# Patient Record
Sex: FEMALE | Race: WHITE | NOT HISPANIC OR LATINO | Employment: FULL TIME | URBAN - METROPOLITAN AREA
[De-identification: names, ages, dates, MRNs, and addresses within clinical notes are randomized per-mention and may not be internally consistent; named-entity substitution may affect disease eponyms.]

---

## 2024-06-12 NOTE — PROGRESS NOTES
Subjective   Patient ID: [id number]   Jade Hemphill is a 43 y.o. female who presents for neck pain, referred by her PCP Dr. Hawkins     Current rheum meds:  - None    Previous rheum meds:  - None     HPI  Covid in January, since then, she feels things have been gotten worse  She had issues with TMJ and is on treatment for it, wears certain gears for her TMJ, one at night, one during the day, doing laser therapy and on Tumeric  Neck, shoulders and arms pains, doing laser therapy for the muscle of the necks by her dentist and that is helping  Pains are worse at the end of the day   No swelling  No MS  On muscle relaxes for months, feels like it didn't help much   Took ibuprofen for months, stopped it a week ago, tried multiple NSAIDs, helped some   Received a month or so of Prednisone for a pinched nerve in her neck (numbness and pain in her left hand) that helped those sx resolve    + moderate stress   + moderate anxiety  + depression on prednisone, now better, comes and goes   + PTSD  + tension headaches, improved   + On and off diarrhea  + fatigue  + On and off chest pains   + rash over her legs, lacy, saw derm, told to see us first and see what it is    PSH: Carpel tunnel bilaterally   Allergies: NKDA   Habits: half a pack of cigarettes per day for 20 years, no alcohol, no drugs   Social hx: , 2 kids    ROS:  Constitutional: Denies fever, chills, weight loss, night sweats or headaches  Eyes: Denies dry eyes, blurry vision, redness or pain  ENT: Denies dry mouth, dental loss, loss of taste, nasal or oral ulcers, jaw claudication, difficulty swallowing, nasal crusting or recurrent sinus infections   Cardiovascular: Denies palpitations, orthopnea  Respiratory: Denies shortness of breath, cough, asthma, or recurrent respiratory infections  Gastrointestinal: Denies dysphagia, nausea, vomiting, heartburn, abdominal pain, constipation, diarrhea, melena or hematochezia  Genitourinary: No recurrent urinary infections or  STDs, no genital or anal ulcers  Integumentary: Denies photosensitivity, Raynaud's phenomenon, skin tightening, digital ulcers, psoriatic lesions, or alopecia  Neurological: Denies any numbness or tingling, muscle weakness, or incontinence   Hematologic/Lymphatic: Denies bleeding, bruising, history of clots (arterial or venous), or abortions/miscarriages/pregnancy complications   MSK: No inflammatory back pain, enthesitis, dactylitis. No morning stiffness   Muscular: Denies weakness, difficulty rising from chair or combing the hair, muscle aches, or problems with hand    FHx: Sister and brother with psoriasis     There is no problem list on file for this patient.     History reviewed. No pertinent past medical history.     History reviewed. No pertinent surgical history.     Social History     Socioeconomic History    Marital status:      Spouse name: Not on file    Number of children: Not on file    Years of education: Not on file    Highest education level: Not on file   Occupational History    Not on file   Tobacco Use    Smoking status: Not on file    Smokeless tobacco: Not on file   Substance and Sexual Activity    Alcohol use: Not on file    Drug use: Not on file    Sexual activity: Not on file   Other Topics Concern    Not on file   Social History Narrative    Not on file     Social Determinants of Health     Financial Resource Strain: Not on file   Food Insecurity: Not on file   Transportation Needs: Not on file   Physical Activity: Not on file   Stress: Not on file   Social Connections: Not on file   Intimate Partner Violence: Not on file   Housing Stability: Not on file     No Known Allergies     Current Outpatient Medications:     chlorzoxazone (Parafon Forte) 500 mg tablet, Take 1 tablet (500 mg) by mouth 3 times a day as needed for muscle spasms., Disp: , Rfl:     magnesium, amino acid chelate, 133 mg tablet, Take 1 tablet (133 mg) by mouth 2 times a day., Disp: , Rfl:     TURMERIC, BULK,  "MISC, , Disp: , Rfl:      Objective   Visit Vitals  /78   Pulse 81   Temp 36.9 °C (98.4 °F)   Resp 20   Wt 45.4 kg (100 lb)     Physical Exam:  General: AAOx3, Cooperative  Head: normocephalic, atraumatic, no hair loss   Eyes: EOMI, conjunctiva clear, sclera white, anicteric  Ears: hearing intact  Nose: no deformity, no crusting   Throat/Mouth: No oral deformities, no cheek swelling, mucosa appear moist, no oral ulcers noted or loss of dentition   Neck/Lymph: FROM, trachea midline  Lungs: chest expansion symmetric, clear to auscultation bilaterally. No wheezing, rhonchi, or stridor  Heart: S1, S2, RRR. No murmur or rub  Abdomen: Soft, non-tender without masses  Skin: Lacy rash over her legs   MSK: Upper Extremities:  Diffuse myofascial point TTP   Hand/Fingers: No erythema, edema, tenderness or warmth at DIP, PIP, or MCP joints, FROM grossly. Good hand . No nodules. No deformities   Wrists: No erythema, edema, warmth or tenderness at wrist, FROM grossly  Elbows: No tenderness, edema, erythema or warmth at elbows, FROM grossly. No nodules   Shoulders: No edema, erythema, tenderness or warmth at shoulders. FROM  Lower Extremities:   Hips: No obvious deformities. No joint tenderness, normal ROM grossly. Log roll test negative bilaterally. Roxana test is negative bilaterally. No trochanteric bursae TTP  Knees: No tenderness, deformities, edema, rashes, or warmth, normal ROM grossly. No crepitus, no pes anserine bursa TTP   Ankles, feet: No deformities, tenderness, edema, erythema, ulceration, or warmth at the ankle or MTP/IP joints, normal ROM grossly  Spine: Paracervical muscles TTP. No spinal tenderness to palpation. Bilateral SI joint tenderness    No results found for: \"WBC\", \"HGB\", \"HCT\", \"MCV\", \"PLT\"     Chemistry    No results found for: \"NA\", \"K\", \"CL\", \"CO2\", \"BUN\", \"CREATININE\", \"GLU\" No results found for: \"CALCIUM\", \"ALKPHOS\", \"AST\", \"ALT\", \"BILITOT\"     No results found for: \"CRP\"   No results found " "for: \"JUAN\", \"RF\", \"SEDRATE\"   No results found for: \"CKTOTAL\"  No results found for: \"NEUTROABS\"   No results found for: \"FERRITIN\"   No results found for: \"HEPATOT\", \"HEPAIGM\", \"HEPBCIGM\", \"HEPBCAB\", \"HBEAG\", \"HEPCAB\"   No results found for: \"ALT\", \"AST\", \"GGT\", \"ALKPHOS\", \"BILITOT\"   No results found for: \"PPD\"   No results found for: \"URICACID\"   No results found for: \"PTH\", \"CALCIUM\", \"CAION\", \"PHOS\"   No results found for: \"SPEP\", \"UPEP\"   No results found for: \"ALBUR\", \"[id number]\"     No image results found.        Assessment/Plan    This is a 43 y.o. female who presents for neck, shoulders and arms pains, referred by her PCP Dr. Hawkins     The patient is mainly complaining of mechanical pains in her neck muscles, shoulders and upper arms. She was diagnosed with TMJ, doing muscles relaxers, mouth guards, and laser therapy for the neck muscles and has been helping. All her sx started after Covid. She has no synovitis on exam. She has diffuse myofascial points TTP with all risk factors for fibromyalgia. Lacy rash over her legs, no hx of blood clots or obstetric complications. Saw derm and told to see us first, if needed they will do biopsy. Prednisone was given for neck pinched nerve and helped with that. Sibling with PsO, but no current IA, uveitis, dactylitis, enthesitis, IBP or inflammatory SI joint pains. No personal PsO. Will do the work up for completion. Discussion done with the patient about OA and fibromaylgia    Labs:  3/24: Troponin, TSH, CBC, Mg, BMP, CMP, ferritin, B12 wnl  CCP, RF, JUAN neg    Imaging:  Xray hips, SI joints, shoulders and T spine: Normal    Xray C spine: Slight anterolisthesis of C2 on  C3, C3 on C4, C4 on C5 with flexion      # Polyarthralgia     - Labs today  - I will inform the patient of any urgent results, if any  - If all tests are negative, will refer to [facility] and pain management  - Recommended swimming to help with her muscle pains     RTC in 1 month for " discussion of results, virtually     Plan, including risks and benefits, was discussed with the patient, informed on how to reach us.     To schedule an appointment, call [phone]  To reach the rheumatology office, call [phone]    Светлана Yang MD      Division of Rheumatology  [facility]

## 2024-06-18 ENCOUNTER — APPOINTMENT (OUTPATIENT)
Dept: RHEUMATOLOGY | Facility: CLINIC | Age: 44
End: 2024-06-18
Payer: COMMERCIAL

## 2024-06-18 ENCOUNTER — LAB (OUTPATIENT)
Dept: LAB | Facility: LAB | Age: 44
End: 2024-06-18
Payer: COMMERCIAL

## 2024-06-18 VITALS
HEART RATE: 81 BPM | RESPIRATION RATE: 20 BRPM | DIASTOLIC BLOOD PRESSURE: 78 MMHG | WEIGHT: 100 LBS | SYSTOLIC BLOOD PRESSURE: 113 MMHG | TEMPERATURE: 98.4 F

## 2024-06-18 DIAGNOSIS — M79.7 FIBROMYALGIA: ICD-10-CM

## 2024-06-18 DIAGNOSIS — M54.2 CERVICALGIA: ICD-10-CM

## 2024-06-18 DIAGNOSIS — M54.2 CERVICALGIA: Primary | ICD-10-CM

## 2024-06-18 DIAGNOSIS — M25.50 POLYARTHRALGIA: ICD-10-CM

## 2024-06-18 DIAGNOSIS — M13.0 POLYARTHRITIS, UNSPECIFIED: ICD-10-CM

## 2024-06-18 DIAGNOSIS — M54.2 NECK PAIN: ICD-10-CM

## 2024-06-18 DIAGNOSIS — M47.812 SPONDYLOSIS OF CERVICAL REGION WITHOUT MYELOPATHY OR RADICULOPATHY: ICD-10-CM

## 2024-06-18 LAB
25(OH)D3 SERPL-MCNC: 18 NG/ML (ref 30–100)
ALBUMIN SERPL BCP-MCNC: 4.6 G/DL (ref 3.4–5)
ALP SERPL-CCNC: 39 U/L (ref 33–110)
ALT SERPL W P-5'-P-CCNC: 15 U/L (ref 7–45)
ANION GAP SERPL CALC-SCNC: 12 MMOL/L (ref 10–20)
AST SERPL W P-5'-P-CCNC: 17 U/L (ref 9–39)
BASOPHILS # BLD AUTO: 0.05 X10*3/UL (ref 0–0.1)
BASOPHILS NFR BLD AUTO: 0.7 %
BILIRUB SERPL-MCNC: 0.8 MG/DL (ref 0–1.2)
BUN SERPL-MCNC: 9 MG/DL (ref 6–23)
CALCIUM SERPL-MCNC: 9 MG/DL (ref 8.6–10.3)
CHLORIDE SERPL-SCNC: 105 MMOL/L (ref 98–107)
CK SERPL-CCNC: 41 U/L (ref 0–215)
CO2 SERPL-SCNC: 26 MMOL/L (ref 21–32)
CREAT SERPL-MCNC: 0.76 MG/DL (ref 0.5–1.05)
EGFRCR SERPLBLD CKD-EPI 2021: >90 ML/MIN/1.73M*2
EOSINOPHIL # BLD AUTO: 0.1 X10*3/UL (ref 0–0.7)
EOSINOPHIL NFR BLD AUTO: 1.4 %
ERYTHROCYTE [DISTWIDTH] IN BLOOD BY AUTOMATED COUNT: 12.2 % (ref 11.5–14.5)
FERRITIN SERPL-MCNC: 33 NG/ML (ref 8–150)
GLUCOSE SERPL-MCNC: 81 MG/DL (ref 74–99)
HCT VFR BLD AUTO: 43 % (ref 36–46)
HGB BLD-MCNC: 14.2 G/DL (ref 12–16)
IMM GRANULOCYTES # BLD AUTO: 0.01 X10*3/UL (ref 0–0.7)
IMM GRANULOCYTES NFR BLD AUTO: 0.1 % (ref 0–0.9)
LYMPHOCYTES # BLD AUTO: 2.13 X10*3/UL (ref 1.2–4.8)
LYMPHOCYTES NFR BLD AUTO: 30 %
MCH RBC QN AUTO: 32.3 PG (ref 26–34)
MCHC RBC AUTO-ENTMCNC: 33 G/DL (ref 32–36)
MCV RBC AUTO: 98 FL (ref 80–100)
MONOCYTES # BLD AUTO: 0.36 X10*3/UL (ref 0.1–1)
MONOCYTES NFR BLD AUTO: 5.1 %
NEUTROPHILS # BLD AUTO: 4.45 X10*3/UL (ref 1.2–7.7)
NEUTROPHILS NFR BLD AUTO: 62.7 %
NRBC BLD-RTO: 0 /100 WBCS (ref 0–0)
PLATELET # BLD AUTO: 155 X10*3/UL (ref 150–450)
POTASSIUM SERPL-SCNC: 4.4 MMOL/L (ref 3.5–5.3)
PROT SERPL-MCNC: 6.6 G/DL (ref 6.4–8.2)
RBC # BLD AUTO: 4.4 X10*6/UL (ref 4–5.2)
SODIUM SERPL-SCNC: 139 MMOL/L (ref 136–145)
TSH SERPL-ACNC: 1.71 MIU/L (ref 0.44–3.98)
WBC # BLD AUTO: 7.1 X10*3/UL (ref 4.4–11.3)

## 2024-06-18 PROCEDURE — 86038 ANTINUCLEAR ANTIBODIES: CPT

## 2024-06-18 PROCEDURE — 85730 THROMBOPLASTIN TIME PARTIAL: CPT

## 2024-06-18 PROCEDURE — 86431 RHEUMATOID FACTOR QUANT: CPT

## 2024-06-18 PROCEDURE — 86160 COMPLEMENT ANTIGEN: CPT

## 2024-06-18 PROCEDURE — 86200 CCP ANTIBODY: CPT

## 2024-06-18 PROCEDURE — 36415 COLL VENOUS BLD VENIPUNCTURE: CPT

## 2024-06-18 PROCEDURE — 85025 COMPLETE CBC W/AUTO DIFF WBC: CPT

## 2024-06-18 PROCEDURE — 86146 BETA-2 GLYCOPROTEIN ANTIBODY: CPT

## 2024-06-18 PROCEDURE — 81381 HLA I TYPING 1 ALLELE HR: CPT

## 2024-06-18 PROCEDURE — 80053 COMPREHEN METABOLIC PANEL: CPT

## 2024-06-18 PROCEDURE — 82728 ASSAY OF FERRITIN: CPT

## 2024-06-18 PROCEDURE — 84443 ASSAY THYROID STIM HORMONE: CPT

## 2024-06-18 PROCEDURE — 99205 OFFICE O/P NEW HI 60 MIN: CPT | Performed by: STUDENT IN AN ORGANIZED HEALTH CARE EDUCATION/TRAINING PROGRAM

## 2024-06-18 PROCEDURE — 86147 CARDIOLIPIN ANTIBODY EA IG: CPT

## 2024-06-18 PROCEDURE — 82550 ASSAY OF CK (CPK): CPT

## 2024-06-18 PROCEDURE — 85613 RUSSELL VIPER VENOM DILUTED: CPT

## 2024-06-18 PROCEDURE — 82306 VITAMIN D 25 HYDROXY: CPT

## 2024-06-18 RX ORDER — CHLORZOXAZONE 500 MG/1
500 TABLET ORAL 3 TIMES DAILY PRN
COMMUNITY

## 2024-06-19 LAB
B2 GLYCOPROT1 IGA SER-ACNC: 2.9 U/ML
B2 GLYCOPROT1 IGG SER-ACNC: <1.4 U/ML
B2 GLYCOPROT1 IGM SER-ACNC: 3.6 U/ML
C3 SERPL-MCNC: 107 MG/DL (ref 87–200)
C4 SERPL-MCNC: 23 MG/DL (ref 10–50)
CARDIOLIPIN IGA SERPL-ACNC: 2.9 APL U/ML
CARDIOLIPIN IGG SER IA-ACNC: <1.6 GPL U/ML
CARDIOLIPIN IGM SER IA-ACNC: 4.6 MPL U/ML
CCP IGG SERPL-ACNC: <1 U/ML
RHEUMATOID FACT SER NEPH-ACNC: <10 IU/ML (ref 0–15)

## 2024-06-20 DIAGNOSIS — E55.9 VITAMIN D DEFICIENCY: Primary | ICD-10-CM

## 2024-06-20 LAB
ANA SER QL HEP2 SUBST: NEGATIVE
DRVVT SCREEN TO CONFIRM RATIO: 0.92 RATIO
DRVVT/DRVVT CFM NRMLZD PPP-RTO: 1.01 RATIO
DRVVT/DRVVT CFM P DOAC NEUT NORM PPP-RTO: 0.91 RATIO
HLAB27 TYPING: NEGATIVE
LA 2 SCREEN W REFLEX-IMP: NORMAL
NORMALIZED SCT PPP-RTO: 1.02 RATIO
SILICA CLOTTING TIME CONFIRMATION: 1.02 RATIO
SILICA CLOTTING TIME SCREEN: 1.04 RATIO

## 2024-06-20 RX ORDER — ERGOCALCIFEROL 1.25 MG/1
50000 CAPSULE ORAL
Qty: 12 CAPSULE | Refills: 1 | Status: SHIPPED | OUTPATIENT
Start: 2024-06-23 | End: 2024-12-20

## 2024-07-02 ENCOUNTER — APPOINTMENT (OUTPATIENT)
Dept: RHEUMATOLOGY | Facility: CLINIC | Age: 44
End: 2024-07-02
Payer: COMMERCIAL

## 2024-08-02 NOTE — PROGRESS NOTES
Subjective   Patient ID: [id number]   Jade Hemphill is a 43 y.o. female who presents for neck pain follow up    Current rheum meds:  - Vitamin D 50,000 international units weekly, started in 6/24     Previous rheum meds:  - None     HPI   Doing Yoga, pool therapy  Still doing the laser therapy   Reports improvement in her pains  Started the vitamin D back in June  Reports some burning sensation around her ears and forehead when she scratches   The patient is otherwise doing well  No morning stiffness, no swelling   No episodes of eye inflammation  No sicca sx  No chest pain, cough or dyspnea  No rashes  No infections    ROS:  As per HPI     Rheum hx:  Covid in January, since then, she feels things have been gotten worse  She had issues with TMJ and is on treatment for it, wears certain gears for her TMJ, one at night, one during the day, doing laser therapy and on Tumeric  Neck, shoulders and arms pains, doing laser therapy for the muscle of the necks by her dentist and that is helping  Pains are worse at the end of the day   No swelling  No MS  On muscle relaxes for months, feels like it didn't help much   Took ibuprofen for months, stopped it a week ago, tried multiple NSAIDs, helped some   Received a month or so of Prednisone for a pinched nerve in her neck (numbness and pain in her left hand) that helped those sx resolve    + moderate stress   + moderate anxiety  + depression on prednisone, now better, comes and goes   + PTSD  + tension headaches, improved   + On and off diarrhea  + fatigue  + On and off chest pains   + rash over her legs, lacy, saw derm, told to see us first and see what it is    PSH: Carpel tunnel bilaterally   Allergies: NKDA   Habits: half a pack of cigarettes per day for 20 years, no alcohol, no drugs   Social hx: , 2 kids  FHx: Sister and brother with psoriasis     There is no problem list on file for this patient.     No past medical history on file.     No past surgical history on  file.     Social History     Socioeconomic History    Marital status:      Spouse name: Not on file    Number of children: Not on file    Years of education: Not on file    Highest education level: Not on file   Occupational History    Not on file   Tobacco Use    Smoking status: Not on file    Smokeless tobacco: Not on file   Substance and Sexual Activity    Alcohol use: Not on file    Drug use: Not on file    Sexual activity: Not on file   Other Topics Concern    Not on file   Social History Narrative    Not on file     Social Determinants of Health     Financial Resource Strain: Not on file   Food Insecurity: Not on file   Transportation Needs: Not on file   Physical Activity: Not on file   Stress: Not on file   Social Connections: Not on file   Intimate Partner Violence: Not on file   Housing Stability: Not on file     No Known Allergies     Current Outpatient Medications:     chlorzoxazone (Parafon Forte) 500 mg tablet, Take 1 tablet (500 mg) by mouth 3 times a day as needed for muscle spasms., Disp: , Rfl:     ergocalciferol (Vitamin D-2) 1.25 MG (83768 UT) capsule, Take 1 capsule (50,000 Units) by mouth 1 (one) time per week., Disp: 12 capsule, Rfl: 1    magnesium, amino acid chelate, 133 mg tablet, Take 1 tablet (133 mg) by mouth 2 times a day., Disp: , Rfl:     TURMERIC, BULK, MISC, , Disp: , Rfl:      Objective   There were no vitals taken for this visit.    Physical Exam:  General: AAOx3, Cooperative  Head: normocephalic, atraumatic, no hair loss   Eyes: EOMI, conjunctiva clear, sclera white, anicteric  Ears: hearing intact  Nose: no deformity, no crusting   Throat/Mouth: No oral deformities, no cheek swelling, mucosa appear moist, no oral ulcers noted or loss of dentition   Neck/Lymph: FROM, trachea midline  Lungs: chest expansion symmetric, clear to auscultation bilaterally. No wheezing, rhonchi, or stridor  Heart: S1, S2, RRR. No murmur or rub  Abdomen: Soft, non-tender without masses  Skin:  Lacy rash over her legs   MSK: Upper Extremities:  Diffuse myofascial point TTP   Hand/Fingers: No erythema, edema, tenderness or warmth at DIP, PIP, or MCP joints, FROM grossly. Good hand . No nodules. No deformities   Wrists: No erythema, edema, warmth or tenderness at wrist, FROM grossly  Elbows: No tenderness, edema, erythema or warmth at elbows, FROM grossly. No nodules   Shoulders: No edema, erythema, tenderness or warmth at shoulders. FROM  Lower Extremities:   Hips: No obvious deformities. No joint tenderness, normal ROM grossly. Log roll test negative bilaterally. Roxana test is negative bilaterally. No trochanteric bursae TTP  Knees: No tenderness, deformities, edema, rashes, or warmth, normal ROM grossly. No crepitus, no pes anserine bursa TTP   Ankles, feet: No deformities, tenderness, edema, erythema, ulceration, or warmth at the ankle or MTP/IP joints, normal ROM grossly  Spine: Paracervical muscles TTP. No spinal tenderness to palpation. Bilateral SI joint tenderness    Lab Results   Component Value Date    WBC 7.1 06/18/2024    HGB 14.2 06/18/2024    HCT 43.0 06/18/2024    MCV 98 06/18/2024     06/18/2024        Chemistry    Lab Results   Component Value Date/Time     06/18/2024 1107    K 4.4 06/18/2024 1107     06/18/2024 1107    CO2 26 06/18/2024 1107    BUN 9 06/18/2024 1107    CREATININE 0.76 06/18/2024 1107    Lab Results   Component Value Date/Time    CALCIUM 9.0 06/18/2024 1107    ALKPHOS 39 06/18/2024 1107    AST 17 06/18/2024 1107    ALT 15 06/18/2024 1107    BILITOT 0.8 06/18/2024 1107        Lab Results   Component Value Date    JUAN Negative 06/18/2024    RF <10 06/18/2024      Lab Results   Component Value Date    CKTOTAL 41 06/18/2024     Lab Results   Component Value Date    NEUTROABS 4.45 06/18/2024      Lab Results   Component Value Date    FERRITIN 33 06/18/2024      Lab Results   Component Value Date    ALT 15 06/18/2024    AST 17 06/18/2024    ALKPHOS 39  06/18/2024    BILITOT 0.8 06/18/2024      Lab Results   Component Value Date    CALCIUM 9.0 06/18/2024     No image results found.        Assessment/Plan    This is a 43 y.o. female who presents for neck, shoulders and arms pains follow up  Last seen in 6/24    The patient is mainly complaining of mechanical pains in her neck muscles, shoulders and upper arms. She was diagnosed with TMJ, doing muscles relaxers, mouth guards, and laser therapy for the neck muscles and has been helping. All her sx started after Covid. She has no synovitis on exam. She has diffuse myofascial points TTP with all risk factors for fibromyalgia. Lacy rash over her legs, no hx of blood clots or obstetric complications. Saw derm and told to see us first, if needed they will do biopsy. Prednisone was given for neck pinched nerve and helped with that. Sibling with PsO, but no current IA, uveitis, dactylitis, enthesitis, IBP or inflammatory SI joint pains. No personal PsO. Will do the work up for completion. Discussion done with the patient about OA and fibromaylgia    Labs:  6/24: CPK, TSH, ferritin, CBC, CMP, C3, C4 wnl. Vitamin D 18  RF, CCP, JUAN, HLA B 27, APLs neg  3/24: Troponin, TSH, CBC, Mg, BMP, CMP, ferritin, B12 wnl  CCP, RF, JUAN neg    Imaging:  Xray hips, SI joints, shoulders and T spine: Normal    Xray C spine: Slight anterolisthesis of C2 on C3, C3 on C4, C4 on C5 with flexion    # Fibromyalgia  # OA  # Vitamin D def    - Continue vitamin D  - Continue tx for TMJ    - Refer to psych and pain management  - Recommended to continue swimming to help with her muscle pains   - Continue Yoga    RTC as needed     Plan, including risks and benefits, was discussed with the patient, informed on how to reach us.     To schedule an appointment, call [phone]  To reach the rheumatology office, call [phone]    Светлана Yang MD      Division of Rheumatology  [facility]

## 2024-08-07 ENCOUNTER — APPOINTMENT (OUTPATIENT)
Dept: RHEUMATOLOGY | Facility: CLINIC | Age: 44
End: 2024-08-07
Payer: COMMERCIAL

## 2024-08-07 DIAGNOSIS — M54.2 CERVICALGIA: ICD-10-CM

## 2024-08-07 DIAGNOSIS — M47.812 SPONDYLOSIS OF CERVICAL REGION WITHOUT MYELOPATHY OR RADICULOPATHY: ICD-10-CM

## 2024-08-07 DIAGNOSIS — E55.9 VITAMIN D DEFICIENCY: ICD-10-CM

## 2024-08-07 DIAGNOSIS — M79.7 FIBROMYALGIA: Primary | ICD-10-CM

## 2024-08-07 PROCEDURE — 99214 OFFICE O/P EST MOD 30 MIN: CPT | Performed by: STUDENT IN AN ORGANIZED HEALTH CARE EDUCATION/TRAINING PROGRAM

## 2025-06-25 ENCOUNTER — OFFICE VISIT (OUTPATIENT)
Dept: NEUROLOGY | Facility: HOSPITAL | Age: 45
End: 2025-06-25
Payer: COMMERCIAL

## 2025-06-25 VITALS — SYSTOLIC BLOOD PRESSURE: 104 MMHG | DIASTOLIC BLOOD PRESSURE: 70 MMHG | WEIGHT: 97.7 LBS | HEART RATE: 76 BPM

## 2025-06-25 DIAGNOSIS — G44.229 CHRONIC TENSION-TYPE HEADACHE, NOT INTRACTABLE: Primary | ICD-10-CM

## 2025-06-25 PROCEDURE — 99214 OFFICE O/P EST MOD 30 MIN: CPT | Performed by: PSYCHIATRY & NEUROLOGY

## 2025-06-25 PROCEDURE — 99204 OFFICE O/P NEW MOD 45 MIN: CPT | Performed by: PSYCHIATRY & NEUROLOGY

## 2025-06-25 ASSESSMENT — PAIN SCALES - GENERAL: PAINLEVEL_OUTOF10: 0-NO PAIN

## 2025-06-25 NOTE — PATIENT INSTRUCTIONS
No indication for Botox for tension HA  Have your eyes checked    Rtc as needed. Follow-up with PCP.

## 2025-06-25 NOTE — LETTER
"June 25, 2025     Gabriele Meyer MD  [address] [place] [facility] [address]    Patient: Jade Hemphill   YOB: 1980   Date of Visit: 6/25/2025       Dear Dr. Gabriele Meyer MD:    Thank you for referring Jade Hemphill to me for evaluation. Below are my notes for this consultation.  If you have questions, please do not hesitate to call me. I look forward to following your patient along with you.       Sincerely,     Ja Okeefe MD      CC: No Recipients  ______________________________________________________________________________________    In-clinic visit    Visit type: provider referral: Dr Gabriele Meyer    PCP: Gabriele Meyer MD.    Subjective    Jade Hemphill is a 44 y.o. year old left handed female who presents with Headache (Tension headache.).    Patient is accompanied by none.     HPI    Here referred by PCP for \"tension headache\", specifically for consideration of Botox injection.    Pt no-showed x1, and had poor internet connection for virtual visit another time.    States had COVID 1/2024, got real sick, ended up with tension HA that won't go away. Ended up with other symptoms as well, described as burning ears, clenching jaw, slurring. Saw TMJ specialist. Was dx fibromyalgia with rheumatologist.     Describes feeling tight on forehead. Feels like it will \"explode\" at times. Was started by PCP on paroxetine, HA now feels better after about 6 weeks. HA sometimes lasts days to months in the past. Non-throbbing. No nausea/vomiting. Initially had some sound sensitivity. Cold in the face used to cause face to tense up.    In last 2 weeks, has had no HA whatsoever. 3 weeks prior to that, felt he may have some slight tension HA.    No head scan.    Has not had eyes checked in a few years.    Massage therapy helped the first time, then went back but felt the massage was overdone and she was in more pain after.    She states she was told by PCP she has tension HA, and was referred to " neurology specifically for consideration of Botox therapy.    Daily smoker. No alcohol. Marijuana use.      Review of Systems   Constitutional:  Negative for appetite change, chills and fever.   HENT:  Negative for ear pain and nosebleeds.    Eyes:  Negative for discharge and itching.   Respiratory:  Negative for choking and chest tightness.    Cardiovascular:  Negative for chest pain and palpitations.   Gastrointestinal:  Negative for abdominal distention, abdominal pain and nausea.   Endocrine: Negative for cold intolerance and heat intolerance.   Genitourinary:  Negative for difficulty urinating and dysuria.   Musculoskeletal:  Negative for gait problem and myalgias.   Neurological:  Negative for dizziness, seizures and numbness.     Problem List[1]    Medical History[2]  Surgical History[3]  Social History     Tobacco Use   • Smoking status: Every Day     Current packs/day: 0.50     Average packs/day: 0.5 packs/day for 27.6 years (13.8 ttl pk-yrs)     Types: Cigarettes     Start date: 1998   • Smokeless tobacco: Never   Substance Use Topics   • Alcohol use: Not Currently     Comment: 1-2 drinks/year     family history includes Parkinsonism in her maternal grandfather.    Allergies[4]  Current Medications[5]    Objective    /70   Pulse 76   Wt (!) 44.3 kg (97 lb 11.2 oz)     No TTP B cervical PSP muscles  No TTP B occipital ridge  No TTP B temporal area  (+) slight spasm B trapezius (R>L)    Awake, alert, oriented x3, in no distress  Well-nourished, ambulatory independently  No leg edema    Mental status exam as above, conversant   Fair fund of knowledge  Recent/remote memory fair  Fair attention span  Pupils round reactive to light, 3-4 mm, (-) RAPD   Fundoscopic examination showed flat sharp disc bilat  Full EOMs intact, no nystagmus, no ptosis   V1 to V3 sensation is intact   No facial droop   Hearing grossly intact   No dysarthria  Good shoulder shrug bilaterally   Tongue is midline     Motor strength  is 5/5 on all extremities, tone/bulk normal   Reflexes 1+ on all 4 extremities, downgoing toes bilaterally   Sensation is intact to light touch, vibration on all 4 extremities   Finger to nose test intact bilaterally   Negative Romberg sign   Normal gait       Lab Results   Component Value Date    WBC 7.1 06/18/2024    RBC 4.40 06/18/2024    HGB 14.2 06/18/2024    HCT 43.0 06/18/2024     06/18/2024     06/18/2024    K 4.4 06/18/2024     06/18/2024    BUN 9 06/18/2024    CREATININE 0.76 06/18/2024    EGFR >90 06/18/2024    CALCIUM 9.0 06/18/2024    ALKPHOS 39 06/18/2024    AST 17 06/18/2024    ALT 15 06/18/2024    VITD25 18 (L) 06/18/2024    HGBA1C 5 05/13/2025    TSH 1.71 06/18/2024        Assessment/Plan    Chronic tension headache  Pt has chronic tension headache, which has been getting better--? With time vs with the help of paroxetine  Pt does not have chronic migraine  Indication for Botox therapy is for chronic migraine. I am not aware of any indication for chronic tension headache  Discussed above with pt  She has tried some other conservative therapies including massages apparently  No brain imaging--normal neuro exam  I don't see strong reason to do brain imaging at this time    Discussed with pt. She states she has some trigger point injections upcoming with PCP.    Plans:  No indication for Botox for tension HA  Have your eyes checked    Rtc as needed. Follow-up with PCP.    All questions were answered.  Pt knows how to contact my office in case pt has any questions or concerns.    Ja Okeefe MD              [1]  Patient Active Problem List  Diagnosis   • Chronic tension-type headache, not intractable   [2]  Past Medical History:  Diagnosis Date   • Anxiety Na   • CTS (carpal tunnel syndrome) 2016   • Fibromyalgia, primary 2024   • Headache 2024   [3]  Past Surgical History:  Procedure Laterality Date   • CARPAL TUNNEL RELEASE  2018   [4]  Allergies  Allergen Reactions   • Pediazole  [Erythromycin-Sulfisoxazole] Rash   [5]    Current Outpatient Medications:   •  MAGNESIUM GLYCINATE ORAL, Take by mouth. 2 caps which equals 310 mg qd, Disp: , Rfl:   •  omega-3 acid ethyl esters (Lovaza) 1 gram capsule, Take 1 capsule (1 g) by mouth 2 times a day. 1280 mg every day via capsule form, Disp: , Rfl:   •  PARoxetine (Paxil) 10 mg tablet, Take 1 tablet (10 mg) by mouth once daily in the morning., Disp: , Rfl:   •  TURMERIC, BULK, MISC, , Disp: , Rfl:        [1]  Patient Active Problem List  Diagnosis   • Chronic tension-type headache, not intractable   [2]  Past Medical History:  Diagnosis Date   • Anxiety Na   • CTS (carpal tunnel syndrome) 2016   • Fibromyalgia, primary 2024   • Headache 2024   [3]  Past Surgical History:  Procedure Laterality Date   • CARPAL TUNNEL RELEASE  2018   [4]  Allergies  Allergen Reactions   • Pediazole [Erythromycin-Sulfisoxazole] Rash   [5]    Current Outpatient Medications:   •  MAGNESIUM GLYCINATE ORAL, Take by mouth. 2 caps which equals 310 mg qd, Disp: , Rfl:   •  omega-3 acid ethyl esters (Lovaza) 1 gram capsule, Take 1 capsule (1 g) by mouth 2 times a day. 1280 mg every day via capsule form, Disp: , Rfl:   •  PARoxetine (Paxil) 10 mg tablet, Take 1 tablet (10 mg) by mouth once daily in the morning., Disp: , Rfl:   •  TURMERIC, BULK, MISC, , Disp: , Rfl:

## 2025-06-25 NOTE — PROGRESS NOTES
"In-clinic visit    Visit type: provider referral: Dr Gabriele Meyer    PCP: Gabriele Meyer MD.    Subjective     Jade Hemphill is a 44 y.o. year old left handed female who presents with Headache (Tension headache.).    Patient is accompanied by none.     HPI    Here referred by PCP for \"tension headache\", specifically for consideration of Botox injection.    Pt no-showed x1, and had poor internet connection for virtual visit another time.    States had COVID 1/2024, got real sick, ended up with tension HA that won't go away. Ended up with other symptoms as well, described as burning ears, clenching jaw, slurring. Saw TMJ specialist. Was dx fibromyalgia with rheumatologist.     Describes feeling tight on forehead. Feels like it will \"explode\" at times. Was started by PCP on paroxetine, HA now feels better after about 6 weeks. HA sometimes lasts days to months in the past. Non-throbbing. No nausea/vomiting. Initially had some sound sensitivity. Cold in the face used to cause face to tense up.    In last 2 weeks, has had no HA whatsoever. 3 weeks prior to that, felt he may have some slight tension HA.    No head scan.    Has not had eyes checked in a few years.    Massage therapy helped the first time, then went back but felt the massage was overdone and she was in more pain after.    She states she was told by PCP she has tension HA, and was referred to neurology specifically for consideration of Botox therapy.    Daily smoker. No alcohol. Marijuana use.      Review of Systems   Constitutional:  Negative for appetite change, chills and fever.   HENT:  Negative for ear pain and nosebleeds.    Eyes:  Negative for discharge and itching.   Respiratory:  Negative for choking and chest tightness.    Cardiovascular:  Negative for chest pain and palpitations.   Gastrointestinal:  Negative for abdominal distention, abdominal pain and nausea.   Endocrine: Negative for cold intolerance and heat intolerance.   Genitourinary:  " Negative for difficulty urinating and dysuria.   Musculoskeletal:  Negative for gait problem and myalgias.   Neurological:  Negative for dizziness, seizures and numbness.     Problem List[1]    Medical History[2]  Surgical History[3]  Social History     Tobacco Use    Smoking status: Every Day     Current packs/day: 0.50     Average packs/day: 0.5 packs/day for 27.6 years (13.8 ttl pk-yrs)     Types: Cigarettes     Start date: 1998    Smokeless tobacco: Never   Substance Use Topics    Alcohol use: Not Currently     Comment: 1-2 drinks/year     family history includes Parkinsonism in her maternal grandfather.    Allergies[4]  Current Medications[5]    Objective     /70   Pulse 76   Wt (!) 44.3 kg (97 lb 11.2 oz)     No TTP B cervical PSP muscles  No TTP B occipital ridge  No TTP B temporal area  (+) slight spasm B trapezius (R>L)    Awake, alert, oriented x3, in no distress  Well-nourished, ambulatory independently  No leg edema    Mental status exam as above, conversant   Fair fund of knowledge  Recent/remote memory fair  Fair attention span  Pupils round reactive to light, 3-4 mm, (-) RAPD   Fundoscopic examination showed flat sharp disc bilat  Full EOMs intact, no nystagmus, no ptosis   V1 to V3 sensation is intact   No facial droop   Hearing grossly intact   No dysarthria  Good shoulder shrug bilaterally   Tongue is midline     Motor strength is 5/5 on all extremities, tone/bulk normal   Reflexes 1+ on all 4 extremities, downgoing toes bilaterally   Sensation is intact to light touch, vibration on all 4 extremities   Finger to nose test intact bilaterally   Negative Romberg sign   Normal gait       Lab Results   Component Value Date    WBC 7.1 06/18/2024    RBC 4.40 06/18/2024    HGB 14.2 06/18/2024    HCT 43.0 06/18/2024     06/18/2024     06/18/2024    K 4.4 06/18/2024     06/18/2024    BUN 9 06/18/2024    CREATININE 0.76 06/18/2024    EGFR >90 06/18/2024    CALCIUM 9.0 06/18/2024     ALKPHOS 39 06/18/2024    AST 17 06/18/2024    ALT 15 06/18/2024    VITD25 18 (L) 06/18/2024    HGBA1C 5 05/13/2025    TSH 1.71 06/18/2024        Assessment/Plan     Chronic tension headache  Pt has chronic tension headache, which has been getting better--? With time vs with the help of paroxetine  Pt does not have chronic migraine  Indication for Botox therapy is for chronic migraine. I am not aware of any indication for chronic tension headache  Discussed above with pt  She has tried some other conservative therapies including massages apparently  No brain imaging--normal neuro exam  I don't see strong reason to do brain imaging at this time    Discussed with pt. She states she has some trigger point injections upcoming with PCP.    Plans:  No indication for Botox for tension HA  Have your eyes checked    Rtc as needed. Follow-up with PCP.    All questions were answered.  Pt knows how to contact my office in case pt has any questions or concerns.    Ja Okeefe MD              [1]   Patient Active Problem List  Diagnosis    Chronic tension-type headache, not intractable   [2]   Past Medical History:  Diagnosis Date    Anxiety Na    CTS (carpal tunnel syndrome) 2016    Fibromyalgia, primary 2024    Headache 2024   [3]   Past Surgical History:  Procedure Laterality Date    CARPAL TUNNEL RELEASE  2018   [4]   Allergies  Allergen Reactions    Pediazole [Erythromycin-Sulfisoxazole] Rash   [5]   Current Outpatient Medications:     MAGNESIUM GLYCINATE ORAL, Take by mouth. 2 caps which equals 310 mg qd, Disp: , Rfl:     omega-3 acid ethyl esters (Lovaza) 1 gram capsule, Take 1 capsule (1 g) by mouth 2 times a day. 1280 mg every day via capsule form, Disp: , Rfl:     PARoxetine (Paxil) 10 mg tablet, Take 1 tablet (10 mg) by mouth once daily in the morning., Disp: , Rfl:     TURMERIC, BULK, MISC, , Disp: , Rfl:

## 2026-01-07 ENCOUNTER — APPOINTMENT (OUTPATIENT)
Dept: NEUROLOGY | Facility: HOSPITAL | Age: 46
End: 2026-01-07
Payer: COMMERCIAL